# Patient Record
Sex: FEMALE | URBAN - METROPOLITAN AREA
[De-identification: names, ages, dates, MRNs, and addresses within clinical notes are randomized per-mention and may not be internally consistent; named-entity substitution may affect disease eponyms.]

---

## 2022-11-17 ENCOUNTER — APPOINTMENT (RX ONLY)
Dept: URBAN - METROPOLITAN AREA CLINIC 41 | Facility: CLINIC | Age: 27
Setting detail: DERMATOLOGY
End: 2022-11-17

## 2022-11-17 DIAGNOSIS — L23.0 ALLERGIC CONTACT DERMATITIS DUE TO METALS: ICD-10-CM | Status: INADEQUATELY CONTROLLED

## 2022-11-17 PROCEDURE — ? TREATMENT REGIMEN

## 2022-11-17 PROCEDURE — 99203 OFFICE O/P NEW LOW 30 MIN: CPT

## 2022-11-17 PROCEDURE — ? COUNSELING

## 2022-11-17 ASSESSMENT — LOCATION SIMPLE DESCRIPTION DERM
LOCATION SIMPLE: ABDOMEN
LOCATION SIMPLE: CHEST

## 2022-11-17 ASSESSMENT — LOCATION ZONE DERM
LOCATION ZONE: TRUNK
LOCATION ZONE: TRUNK

## 2022-11-17 ASSESSMENT — LOCATION DETAILED DESCRIPTION DERM
LOCATION DETAILED: RIGHT LATERAL SUPERIOR CHEST
LOCATION DETAILED: LEFT LATERAL ABDOMEN
LOCATION DETAILED: RIGHT LATERAL ABDOMEN

## 2022-11-17 NOTE — HPI: RASH
How Severe Is Your Rash?: mild
Is This A New Presentation, Or A Follow-Up?: Rash
Additional History: Pts PCP instructed her to use use center soaps and moisturizers. Pt currently uses clobetasol for about 2 weeks until rash is managed but then discontinues; however, the rash continues to recur.  She does note that she has a history of sensitive skin. She wears a belly ring and routinely uses a belt with metal belt buckle to help her roll and secure her pants for work.  She denies anything metal that is used on the clothing or the right chest.

## 2022-11-17 NOTE — PROCEDURE: TREATMENT REGIMEN
Plan: Recommended a type of belt that does not have metal, along with a plastic belly button ring to allow stomach to calm.  The patient may consider maternity type pants with a belly panel to avoid the use of belt. Pt can continue using steroid cream as needed, pt reports she currently has enough but may call if more is needed. Pt to follow up in 6 weeks and if rash is not going away then will discontinue clobetasol two weeks prior in order for a more determinate biopsy. If rash has cleared with treatment regimen pt instructed to cancel appointment.  Most likely agent would be nickel - this was discussed with the patient as well as potential cross reaction with cobalt.  Suspect the chronic contact on the abdomen along with heat and moisture and occlusion in the area exacerbated the sensitivity to this product.
Detail Level: Simple